# Patient Record
Sex: FEMALE
[De-identification: names, ages, dates, MRNs, and addresses within clinical notes are randomized per-mention and may not be internally consistent; named-entity substitution may affect disease eponyms.]

---

## 2022-05-12 PROBLEM — Z00.00 ENCOUNTER FOR PREVENTIVE HEALTH EXAMINATION: Status: ACTIVE | Noted: 2022-05-12

## 2022-05-17 ENCOUNTER — APPOINTMENT (OUTPATIENT)
Dept: ORTHOPEDIC SURGERY | Facility: CLINIC | Age: 52
End: 2022-05-17
Payer: COMMERCIAL

## 2022-05-17 VITALS — WEIGHT: 105 LBS | BODY MASS INDEX: 19.32 KG/M2 | HEIGHT: 62 IN

## 2022-05-17 DIAGNOSIS — I51.9 HEART DISEASE, UNSPECIFIED: ICD-10-CM

## 2022-05-17 DIAGNOSIS — Z78.9 OTHER SPECIFIED HEALTH STATUS: ICD-10-CM

## 2022-05-17 DIAGNOSIS — S93.601A UNSPECIFIED SPRAIN OF RIGHT FOOT, INITIAL ENCOUNTER: ICD-10-CM

## 2022-05-17 PROCEDURE — 99203 OFFICE O/P NEW LOW 30 MIN: CPT

## 2022-05-17 NOTE — DATA REVIEWED
[Outside X-rays] : outside x-rays [Right] : of the right [Foot] : foot [I reviewed the films/CD] : I reviewed the films/CD

## 2022-05-17 NOTE — HISTORY OF PRESENT ILLNESS
[1] : 2 [0] : 0 [Sharp] : sharp [Rest] : rest [Heat] : heat [de-identified] : 5/17/22 Initial visit for this 51 year female who rolled rt ankle x 6 weeks ago. C/o pain and swelling at the time. Since then has rested, applied ice, and now feels 95% better.\par PMH: NO prior injury\par  [] : no [FreeTextEntry1] : right foot [FreeTextEntry9] : p [de-identified] : pressure to lateral inocencio eof foot [de-identified] : 8/16/22 [de-identified] : dr Rodriguez

## 2022-05-17 NOTE — PHYSICAL EXAM
[Normal Mood and Affect] : normal mood and affect [Orientated] : orientated [Able to Communicate] : able to communicate [Well Developed] : well developed [Well Nourished] : well nourished [Right] : right foot and ankle [NL (40)] : plantar flexion 40 degrees [NL 30)] : inversion 30 degrees [NL (20)] : eversion 20 degrees [] : no achilles tendon insertion tenderness

## 2022-05-17 NOTE — REASON FOR VISIT
[FreeTextEntry2] : pain and swelling of right foot since twisting 6 weeks ago. Seen by podiatrist Dr Shane Prather 5/4/22  where  x ray was done.. also seen yesterday by PCP Enmanuel Noe.

## 2023-02-20 ENCOUNTER — OFFICE (OUTPATIENT)
Dept: URBAN - METROPOLITAN AREA CLINIC 109 | Facility: CLINIC | Age: 53
Setting detail: OPHTHALMOLOGY
End: 2023-02-20
Payer: COMMERCIAL

## 2023-02-20 DIAGNOSIS — H35.041: ICD-10-CM

## 2023-02-20 DIAGNOSIS — H04.123: ICD-10-CM

## 2023-02-20 DIAGNOSIS — H25.13: ICD-10-CM

## 2023-02-20 PROCEDURE — 99213 OFFICE O/P EST LOW 20 MIN: CPT | Performed by: OPHTHALMOLOGY

## 2023-02-20 ASSESSMENT — REFRACTION_MANIFEST
OD_VA1: 20/20
OS_SPHERE: -1.50
OS_VA1: 20/20
OD_SPHERE: -3.00

## 2023-02-20 ASSESSMENT — REFRACTION_AUTOREFRACTION
OD_AXIS: 159
OS_AXIS: 108
OD_SPHERE: -3.25
OS_SPHERE: -1.00
OD_CYLINDER: -0.25
OS_CYLINDER: -0.75

## 2023-02-20 ASSESSMENT — REFRACTION_CURRENTRX
OD_OVR_VA: 20/
OS_SPHERE: -2.00
OD_AXIS: 8
OS_OVR_VA: 20/
OD_SPHERE: -3.25
OS_AXIS: 179
OS_CYLINDER: -0.25
OD_CYLINDER: -0.25
OS_OVR_VA: 20/
OD_OVR_VA: 20/

## 2023-02-20 ASSESSMENT — VISUAL ACUITY
OD_BCVA: 20/20-2
OS_BCVA: 20/20-1

## 2023-02-20 ASSESSMENT — DECREASING TEAR LAKE - SEVERITY SCORE
OS_DEC_TEARLAKE: T
OD_DEC_TEARLAKE: T

## 2023-02-20 ASSESSMENT — SPHEQUIV_DERIVED
OS_SPHEQUIV: -1.375
OD_SPHEQUIV: -3.375

## 2023-02-20 ASSESSMENT — CONFRONTATIONAL VISUAL FIELD TEST (CVF)
OD_FINDINGS: FULL
OS_FINDINGS: FULL

## 2023-02-20 ASSESSMENT — TONOMETRY
OD_IOP_MMHG: 19
OS_IOP_MMHG: 19

## 2024-02-18 ENCOUNTER — NON-APPOINTMENT (OUTPATIENT)
Age: 54
End: 2024-02-18

## 2024-03-16 ENCOUNTER — OFFICE (OUTPATIENT)
Dept: URBAN - METROPOLITAN AREA CLINIC 109 | Facility: CLINIC | Age: 54
Setting detail: OPHTHALMOLOGY
End: 2024-03-16
Payer: COMMERCIAL

## 2024-03-16 DIAGNOSIS — H35.041: ICD-10-CM

## 2024-03-16 DIAGNOSIS — H25.13: ICD-10-CM

## 2024-03-16 DIAGNOSIS — H04.123: ICD-10-CM

## 2024-03-16 PROCEDURE — 92201 OPSCPY EXTND RTA DRAW UNI/BI: CPT | Performed by: OPHTHALMOLOGY

## 2024-03-16 PROCEDURE — 92014 COMPRE OPH EXAM EST PT 1/>: CPT | Performed by: OPHTHALMOLOGY

## 2024-03-16 ASSESSMENT — REFRACTION_CURRENTRX
OD_CYLINDER: -0.25
OD_SPHERE: -3.25
OS_CYLINDER: -0.25
OS_OVR_VA: 20/
OD_OVR_VA: 20/
OS_OVR_VA: 20/
OD_OVR_VA: 20/
OS_AXIS: 179
OD_AXIS: 8
OS_SPHERE: -2.00

## 2024-03-16 ASSESSMENT — REFRACTION_MANIFEST
OD_VA1: 20/20
OS_SPHERE: -1.50
OS_VA1: 20/20
OD_SPHERE: -3.00

## 2024-08-29 ENCOUNTER — APPOINTMENT (OUTPATIENT)
Dept: ORTHOPEDIC SURGERY | Facility: CLINIC | Age: 54
End: 2024-08-29
Payer: COMMERCIAL

## 2024-08-29 VITALS — WEIGHT: 115 LBS | HEIGHT: 62 IN | BODY MASS INDEX: 21.16 KG/M2

## 2024-08-29 DIAGNOSIS — S73.102A UNSPECIFIED SPRAIN OF LEFT HIP, INITIAL ENCOUNTER: ICD-10-CM

## 2024-08-29 PROCEDURE — 99214 OFFICE O/P EST MOD 30 MIN: CPT

## 2024-08-29 PROCEDURE — 73503 X-RAY EXAM HIP UNI 4/> VIEWS: CPT | Mod: LT

## 2024-08-29 RX ORDER — MELOXICAM 15 MG/1
15 TABLET ORAL DAILY
Qty: 30 | Refills: 5 | Status: ACTIVE | COMMUNITY
Start: 2024-08-29 | End: 1900-01-01

## 2024-08-29 NOTE — PLAN
[TextEntry] : The patient was advised of the diagnosis. The natural history of the pathology was explained in full to the patient in layman's terms. All questions were answered. The risks and benefits of surgical and non-surgical treatment alternatives were explained in full to the patient.  Start Mobic daily  Patient is being referred for physical therapy for various modalities.

## 2024-08-29 NOTE — HISTORY OF PRESENT ILLNESS
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 C/o soreness since.. Worse sitting long periods of time. She notices numbness when sitting long periods or on edge of the toilet. NO pain lying on lt side at night. Tried advil prn only.  PMH" Hx of intermittent LBP in the past 10 years. [] : no [FreeTextEntry1] : LT hip

## 2024-08-29 NOTE — REASON FOR VISIT
[FreeTextEntry2] : NI---LT hip. Squatted over and dog pushed her over on her left hip about a month ago. Occasional numbness when she sits on a hard surface. Soreness on the lateral side of her left thigh. Long periods of sitting aggravate the soreness. Denies prior treatment.

## 2024-08-29 NOTE — PHYSICAL EXAM
[Normal Mood and Affect] : normal mood and affect [Able to Communicate] : able to communicate [Well Developed] : well developed [Well Nourished] : well nourished [NL (90)] : forward flexion 90 degrees [NL (30)] : right lateral bending 30 degrees [Left] : left hip [5___] : abduction 5[unfilled]/5 [AP] : anteroposterior [There are no fractures, subluxations or dislocations. No significant abnormalities are seen] : There are no fractures, subluxations or dislocations. No significant abnormalities are seen [de-identified] : thin [] : non-antalgic

## 2024-09-23 ENCOUNTER — APPOINTMENT (OUTPATIENT)
Dept: ORTHOPEDIC SURGERY | Facility: CLINIC | Age: 54
End: 2024-09-23
Payer: COMMERCIAL

## 2024-09-23 VITALS — WEIGHT: 115 LBS | HEIGHT: 62 IN | BODY MASS INDEX: 21.16 KG/M2

## 2024-09-23 DIAGNOSIS — S73.102D UNSPECIFIED SPRAIN OF LEFT HIP, SUBSEQUENT ENCOUNTER: ICD-10-CM

## 2024-09-23 PROCEDURE — 99213 OFFICE O/P EST LOW 20 MIN: CPT

## 2024-09-23 NOTE — HISTORY OF PRESENT ILLNESS
[4] : 4 [2] : 2 [Dull/Aching] : dull/aching [Intermittent] : intermittent [Rest] : rest [Ice] : ice [Sitting] : sitting [de-identified] : 09/23/24:  Patient returns today still c/o soreness lt buttock since her last visit. Hurts to sit on a bike seat and any hard surface. Has taken Mobic x 2 weeks which does help. Went to PT x 1 visit but didn't feel like therapy would help. Numbness has slowly resolved.  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 C/o soreness since.. Worse sitting long periods of time. She notices numbness when sitting long periods or on edge of the toilet. NO pain lying on lt side at night. Tried advil prn only.  PMH" Hx of intermittent LBP in the past 10 years. [] : no [FreeTextEntry1] : LT hip

## 2024-09-23 NOTE — PLAN
[TextEntry] : The patient was advised of the diagnosis. The natural history of the pathology was explained in full to the patient in layman's terms. All questions were answered. The risks and benefits of surgical and non-surgical treatment alternatives were explained in full to the patient.  Continue Mobic  Patient was advised to continue with physical therapy.  Will obtain an MRI lt hip

## 2024-09-23 NOTE — REASON FOR VISIT
[FreeTextEntry2] : f/u LT hip. Hip pain is improving, but not fully better. Something still feels a little off in her hip.

## 2024-09-23 NOTE — PHYSICAL EXAM
[Normal Mood and Affect] : normal mood and affect [Able to Communicate] : able to communicate [Well Developed] : well developed [Well Nourished] : well nourished [NL (90)] : forward flexion 90 degrees [NL (30)] : right lateral bending 30 degrees [Left] : left hip [5___] : abduction 5[unfilled]/5 [AP] : anteroposterior [There are no fractures, subluxations or dislocations. No significant abnormalities are seen] : There are no fractures, subluxations or dislocations. No significant abnormalities are seen [de-identified] : thin [] : non-antalgic

## 2024-10-22 ENCOUNTER — APPOINTMENT (OUTPATIENT)
Dept: ORTHOPEDIC SURGERY | Facility: CLINIC | Age: 54
End: 2024-10-22
Payer: COMMERCIAL

## 2024-10-22 VITALS — HEIGHT: 62 IN | WEIGHT: 115 LBS | BODY MASS INDEX: 21.16 KG/M2

## 2024-10-22 DIAGNOSIS — S73.102D UNSPECIFIED SPRAIN OF LEFT HIP, SUBSEQUENT ENCOUNTER: ICD-10-CM

## 2024-10-22 PROCEDURE — 99213 OFFICE O/P EST LOW 20 MIN: CPT

## 2025-03-17 ENCOUNTER — OFFICE (OUTPATIENT)
Dept: URBAN - METROPOLITAN AREA CLINIC 109 | Facility: CLINIC | Age: 55
Setting detail: OPHTHALMOLOGY
End: 2025-03-17
Payer: COMMERCIAL

## 2025-03-17 VITALS — HEIGHT: 55 IN

## 2025-03-17 DIAGNOSIS — H52.13: ICD-10-CM

## 2025-03-17 DIAGNOSIS — H16.223: ICD-10-CM

## 2025-03-17 DIAGNOSIS — H35.041: ICD-10-CM

## 2025-03-17 DIAGNOSIS — H25.13: ICD-10-CM

## 2025-03-17 PROCEDURE — 92014 COMPRE OPH EXAM EST PT 1/>: CPT | Performed by: OPHTHALMOLOGY

## 2025-03-17 PROCEDURE — 92015 DETERMINE REFRACTIVE STATE: CPT | Performed by: OPHTHALMOLOGY

## 2025-03-17 ASSESSMENT — REFRACTION_CURRENTRX
OS_CYLINDER: -0.25
OD_CYLINDER: -0.25
OD_OVR_VA: 20/
OD_OVR_VA: 20/
OS_AXIS: 065
OS_SPHERE: -1.50
OD_SPHERE: -3.00
OS_OVR_VA: 20/
OS_OVR_VA: 20/
OD_AXIS: 065
OD_VPRISM_DIRECTION: SV
OS_VPRISM_DIRECTION: SV

## 2025-03-17 ASSESSMENT — REFRACTION_MANIFEST
OS_VA1: 20/20
OD_SPHERE: -3.00
OD_VA1: 20/20
OS_SPHERE: -1.50

## 2025-03-17 ASSESSMENT — DECREASING TEAR LAKE - SEVERITY SCORE
OD_DEC_TEARLAKE: T
OS_DEC_TEARLAKE: T

## 2025-03-17 ASSESSMENT — REFRACTION_AUTOREFRACTION
OS_CYLINDER: -0.50
OD_AXIS: 044
OS_SPHERE: -1.00
OS_AXIS: 127
OD_CYLINDER: -0.25
OD_SPHERE: -2.75

## 2025-03-17 ASSESSMENT — VISUAL ACUITY
OD_BCVA: 20/20
OS_BCVA: 20/20

## 2025-03-17 ASSESSMENT — CONFRONTATIONAL VISUAL FIELD TEST (CVF)
OD_FINDINGS: FULL
OS_FINDINGS: FULL

## 2025-03-17 ASSESSMENT — KERATOMETRY
OS_AXISANGLE_DEGREES: 029
OD_K2POWER_DIOPTERS: 42.25
OD_K1POWER_DIOPTERS: 42.00
OS_K1POWER_DIOPTERS: 41.75
OD_AXISANGLE_DEGREES: 096
OS_K2POWER_DIOPTERS: 42.00

## 2025-03-17 ASSESSMENT — TONOMETRY
OS_IOP_MMHG: 18
OD_IOP_MMHG: 18